# Patient Record
Sex: FEMALE | Race: WHITE
[De-identification: names, ages, dates, MRNs, and addresses within clinical notes are randomized per-mention and may not be internally consistent; named-entity substitution may affect disease eponyms.]

---

## 2020-07-06 ENCOUNTER — HOSPITAL ENCOUNTER (EMERGENCY)
Dept: HOSPITAL 9 - MADERS | Age: 55
Discharge: HOME | End: 2020-07-06
Payer: MEDICARE

## 2020-07-06 DIAGNOSIS — G62.9: ICD-10-CM

## 2020-07-06 DIAGNOSIS — Z79.899: ICD-10-CM

## 2020-07-06 DIAGNOSIS — F32.9: ICD-10-CM

## 2020-07-06 DIAGNOSIS — M19.90: ICD-10-CM

## 2020-07-06 DIAGNOSIS — F17.210: ICD-10-CM

## 2020-07-06 DIAGNOSIS — F41.9: ICD-10-CM

## 2020-07-06 DIAGNOSIS — M25.532: Primary | ICD-10-CM

## 2020-07-06 NOTE — RAD
RADIOGRAPH LEFT WRIST THREE VIEWS:

 

Date: 7-6-2020

 

History: 

54-year-old female with nontraumatic left wrist pain. 

 

FINDINGS: 

Joint spaces are maintained without erosions or osteophytes. No soft tissue calcifications. Bone mine
ralization within normal limits. No fracture or destructive osseous lesion. 

 

IMPRESSION: 

Negative.

 

POS: [facility]